# Patient Record
Sex: MALE | Race: OTHER | HISPANIC OR LATINO | Employment: OTHER | URBAN - METROPOLITAN AREA
[De-identification: names, ages, dates, MRNs, and addresses within clinical notes are randomized per-mention and may not be internally consistent; named-entity substitution may affect disease eponyms.]

---

## 2021-03-20 ENCOUNTER — HOSPITAL ENCOUNTER (EMERGENCY)
Facility: HOSPITAL | Age: 21
Discharge: HOME | End: 2021-03-20
Attending: EMERGENCY MEDICINE

## 2021-03-20 VITALS
WEIGHT: 200 LBS | HEIGHT: 66 IN | SYSTOLIC BLOOD PRESSURE: 135 MMHG | BODY MASS INDEX: 32.14 KG/M2 | OXYGEN SATURATION: 98 % | DIASTOLIC BLOOD PRESSURE: 74 MMHG | RESPIRATION RATE: 18 BRPM | TEMPERATURE: 98 F | HEART RATE: 71 BPM

## 2021-03-20 DIAGNOSIS — T78.40XA ALLERGIC REACTION, INITIAL ENCOUNTER: Primary | ICD-10-CM

## 2021-03-20 PROCEDURE — 63600175 PHARM REV CODE 636 W HCPCS: Performed by: PHYSICIAN ASSISTANT

## 2021-03-20 PROCEDURE — 99284 EMERGENCY DEPT VISIT MOD MDM: CPT

## 2021-03-20 PROCEDURE — 25000003 PHARM REV CODE 250: Performed by: PHYSICIAN ASSISTANT

## 2021-03-20 RX ORDER — CETIRIZINE HYDROCHLORIDE 10 MG/1
10 TABLET ORAL DAILY
Qty: 10 TABLET | Refills: 0 | Status: SHIPPED | OUTPATIENT
Start: 2021-03-20 | End: 2021-03-30

## 2021-03-20 RX ORDER — DIPHENHYDRAMINE HCL 25 MG
25-50 CAPSULE ORAL EVERY 6 HOURS PRN
Qty: 20 CAPSULE | Refills: 0 | Status: SHIPPED | OUTPATIENT
Start: 2021-03-20

## 2021-03-20 RX ORDER — DIPHENHYDRAMINE HCL 25 MG
25 CAPSULE ORAL
Status: COMPLETED | OUTPATIENT
Start: 2021-03-20 | End: 2021-03-20

## 2021-03-20 RX ORDER — PREDNISONE 20 MG/1
60 TABLET ORAL
Status: COMPLETED | OUTPATIENT
Start: 2021-03-20 | End: 2021-03-20

## 2021-03-20 RX ORDER — FAMOTIDINE 20 MG/1
20 TABLET, FILM COATED ORAL
Status: COMPLETED | OUTPATIENT
Start: 2021-03-20 | End: 2021-03-20

## 2021-03-20 RX ORDER — PREDNISONE 20 MG/1
TABLET ORAL
Qty: 7 TABLET | Refills: 0 | Status: SHIPPED | OUTPATIENT
Start: 2021-03-20 | End: 2021-03-26

## 2021-03-20 RX ORDER — FAMOTIDINE 20 MG/1
20 TABLET, FILM COATED ORAL 2 TIMES DAILY
Qty: 20 TABLET | Refills: 0 | Status: SHIPPED | OUTPATIENT
Start: 2021-03-20 | End: 2021-03-30

## 2021-03-20 RX ADMIN — PREDNISONE 60 MG: 20 TABLET ORAL at 05:03

## 2021-03-20 RX ADMIN — DIPHENHYDRAMINE HYDROCHLORIDE 25 MG: 25 CAPSULE ORAL at 05:03

## 2021-03-20 RX ADMIN — FAMOTIDINE 20 MG: 20 TABLET ORAL at 05:03

## 2023-10-18 ENCOUNTER — HOSPITAL ENCOUNTER (EMERGENCY)
Facility: HOSPITAL | Age: 23
Discharge: HOME | End: 2023-10-18
Attending: STUDENT IN AN ORGANIZED HEALTH CARE EDUCATION/TRAINING PROGRAM

## 2023-10-18 VITALS
DIASTOLIC BLOOD PRESSURE: 74 MMHG | RESPIRATION RATE: 18 BRPM | HEART RATE: 64 BPM | SYSTOLIC BLOOD PRESSURE: 132 MMHG | BODY MASS INDEX: 34.53 KG/M2 | TEMPERATURE: 98 F | WEIGHT: 220 LBS | HEIGHT: 67 IN | OXYGEN SATURATION: 99 %

## 2023-10-18 DIAGNOSIS — K21.9 GASTRIC REFLUX: Primary | ICD-10-CM

## 2023-10-18 DIAGNOSIS — R10.9 ABDOMINAL PAIN, UNSPECIFIED ABDOMINAL LOCATION: ICD-10-CM

## 2023-10-18 LAB
BASOPHILS # BLD AUTO: 0.04 K/UL (ref 0–0.2)
BASOPHILS NFR BLD: 0.4 % (ref 0–1.9)
BILIRUB UR QL STRIP: NEGATIVE
CLARITY UR: CLEAR
COLOR UR: YELLOW
DIFFERENTIAL METHOD: ABNORMAL
EOSINOPHIL # BLD AUTO: 0.3 K/UL (ref 0–0.5)
EOSINOPHIL NFR BLD: 2.5 % (ref 0–8)
ERYTHROCYTE [DISTWIDTH] IN BLOOD BY AUTOMATED COUNT: 12.8 % (ref 11.5–14.5)
GLUCOSE UR QL STRIP: NEGATIVE
HCT VFR BLD AUTO: 41.8 % (ref 40–54)
HGB BLD-MCNC: 14.1 G/DL (ref 14–18)
HGB UR QL STRIP: ABNORMAL
IMM GRANULOCYTES # BLD AUTO: 0.02 K/UL (ref 0–0.04)
IMM GRANULOCYTES NFR BLD AUTO: 0.2 % (ref 0–0.5)
KETONES UR QL STRIP: NEGATIVE
LEUKOCYTE ESTERASE UR QL STRIP: NEGATIVE
LYMPHOCYTES # BLD AUTO: 3.2 K/UL (ref 1–4.8)
LYMPHOCYTES NFR BLD: 29 % (ref 18–48)
MCH RBC QN AUTO: 27.3 PG (ref 27–31)
MCHC RBC AUTO-ENTMCNC: 33.7 G/DL (ref 32–36)
MCV RBC AUTO: 81 FL (ref 82–98)
MONOCYTES # BLD AUTO: 0.7 K/UL (ref 0.3–1)
MONOCYTES NFR BLD: 6 % (ref 4–15)
NEUTROPHILS # BLD AUTO: 6.8 K/UL (ref 1.8–7.7)
NEUTROPHILS NFR BLD: 61.9 % (ref 38–73)
NITRITE UR QL STRIP: NEGATIVE
NRBC BLD-RTO: 0 /100 WBC
PH UR STRIP: 5 [PH] (ref 5–8)
PLATELET # BLD AUTO: 231 K/UL (ref 150–450)
PMV BLD AUTO: 11.3 FL (ref 9.2–12.9)
PROT UR QL STRIP: ABNORMAL
RBC # BLD AUTO: 5.17 M/UL (ref 4.6–6.2)
SP GR UR STRIP: >1.03 (ref 1–1.03)
URN SPEC COLLECT METH UR: ABNORMAL
UROBILINOGEN UR STRIP-ACNC: NEGATIVE EU/DL
WBC # BLD AUTO: 11.04 K/UL (ref 3.9–12.7)

## 2023-10-18 PROCEDURE — 25000003 PHARM REV CODE 250: Performed by: NURSE PRACTITIONER

## 2023-10-18 PROCEDURE — 63600175 PHARM REV CODE 636 W HCPCS

## 2023-10-18 PROCEDURE — 85025 COMPLETE CBC W/AUTO DIFF WBC: CPT | Performed by: NURSE PRACTITIONER

## 2023-10-18 PROCEDURE — 99284 EMERGENCY DEPT VISIT MOD MDM: CPT

## 2023-10-18 PROCEDURE — 81003 URINALYSIS AUTO W/O SCOPE: CPT | Performed by: NURSE PRACTITIONER

## 2023-10-18 PROCEDURE — 96372 THER/PROPH/DIAG INJ SC/IM: CPT

## 2023-10-18 RX ORDER — MAG HYDROX/ALUMINUM HYD/SIMETH 200-200-20
30 SUSPENSION, ORAL (FINAL DOSE FORM) ORAL
Status: COMPLETED | OUTPATIENT
Start: 2023-10-18 | End: 2023-10-18

## 2023-10-18 RX ORDER — IBUPROFEN 600 MG/1
600 TABLET ORAL EVERY 6 HOURS PRN
Qty: 20 TABLET | Refills: 0 | Status: SHIPPED | OUTPATIENT
Start: 2023-10-18

## 2023-10-18 RX ORDER — KETOROLAC TROMETHAMINE 30 MG/ML
30 INJECTION, SOLUTION INTRAMUSCULAR; INTRAVENOUS
Status: COMPLETED | OUTPATIENT
Start: 2023-10-18 | End: 2023-10-18

## 2023-10-18 RX ORDER — ACETAMINOPHEN 500 MG
500 TABLET ORAL EVERY 6 HOURS PRN
Qty: 30 TABLET | Refills: 0 | Status: SHIPPED | OUTPATIENT
Start: 2023-10-18

## 2023-10-18 RX ORDER — ALUMINUM HYDROXIDE, MAGNESIUM HYDROXIDE, AND SIMETHICONE 2400; 240; 2400 MG/30ML; MG/30ML; MG/30ML
5 SUSPENSION ORAL EVERY 6 HOURS PRN
Qty: 335 ML | Refills: 0 | Status: SHIPPED | OUTPATIENT
Start: 2023-10-18 | End: 2024-10-17

## 2023-10-18 RX ORDER — ONDANSETRON 4 MG/1
4 TABLET, ORALLY DISINTEGRATING ORAL EVERY 6 HOURS PRN
Qty: 15 TABLET | Refills: 0 | Status: SHIPPED | OUTPATIENT
Start: 2023-10-18

## 2023-10-18 RX ORDER — FAMOTIDINE 20 MG/1
20 TABLET, FILM COATED ORAL 2 TIMES DAILY
Qty: 20 TABLET | Refills: 0 | Status: SHIPPED | OUTPATIENT
Start: 2023-10-18 | End: 2024-10-17

## 2023-10-18 RX ADMIN — KETOROLAC TROMETHAMINE 30 MG: 30 INJECTION, SOLUTION INTRAMUSCULAR; INTRAVENOUS at 07:10

## 2023-10-18 RX ADMIN — ALUMINUM HYDROXIDE, MAGNESIUM HYDROXIDE, AND DIMETHICONE 30 ML: 200; 20; 200 SUSPENSION ORAL at 06:10

## 2023-10-19 NOTE — DISCHARGE INSTRUCTIONS

## 2023-10-19 NOTE — ED PROVIDER NOTES
"Encounter Date: 10/18/2023       History     Chief Complaint   Patient presents with    Abdominal Pain     Reports epigastric "very severe and sharp pain." Started 3 days ago, states he started a new diet 3 months ago and thinks it may be related to that. Denies n/v/ diarrhea. No meds PTA     The history is provided by the patient, medical records and a significant other. A  was used.     23-year-old male with no known pertinent past medical history presenting to the emergency department today complaining of epigastric pain x3 days.  Patient states he started a new low-fat diet 3 months ago and thinks this may be the cause of his symptoms.  Patient has not taken any medications for his symptoms.  Patient states lying flat makes his symptoms worse.  Patient states the pain is 6/10 localized substernal and epigastric and burning sensation.  Patient denies any fever, chest pain, shortness of breath, N/V/D, flank pain, dysuria, melena/hematochezia    Review of patient's allergies indicates:  No Known Allergies  History reviewed. No pertinent past medical history.  History reviewed. No pertinent surgical history.  History reviewed. No pertinent family history.  Social History     Tobacco Use    Smoking status: Never    Smokeless tobacco: Never   Substance Use Topics    Alcohol use: Never    Drug use: Never     Review of Systems   Constitutional:  Negative for chills, diaphoresis and fever.   HENT:  Negative for congestion, rhinorrhea and sore throat.    Eyes:  Negative for pain, redness and visual disturbance.   Respiratory:  Negative for cough and shortness of breath.    Cardiovascular:  Negative for chest pain.   Gastrointestinal:  Positive for abdominal pain (Epigastric). Negative for abdominal distention, blood in stool, constipation, diarrhea, nausea and vomiting.   Genitourinary:  Negative for difficulty urinating, dysuria, flank pain and hematuria.   Musculoskeletal:  Negative for back pain, " neck pain and neck stiffness.   Skin:  Negative for rash and wound.   Neurological:  Negative for dizziness, weakness, light-headedness and headaches.   Hematological:  Does not bruise/bleed easily.   Psychiatric/Behavioral:  Negative for decreased concentration.        Physical Exam     Initial Vitals [10/18/23 1822]   BP Pulse Resp Temp SpO2   (!) 140/84 60 18 98.2 °F (36.8 °C) 99 %      MAP       --         Physical Exam    Nursing note and vitals reviewed.  Constitutional: He appears well-developed and well-nourished. He is not diaphoretic. He does not have a sickly appearance. He does not appear ill. No distress.   HENT:   Head: Normocephalic and atraumatic. Head is without raccoon's eyes and without Ta's sign.   Right Ear: External ear normal.   Left Ear: External ear normal.   Nose: Nose normal.   Mouth/Throat: Uvula is midline, oropharynx is clear and moist and mucous membranes are normal.   Eyes: Conjunctivae and EOM are normal. Pupils are equal, round, and reactive to light. Right eye exhibits no discharge. Left eye exhibits no discharge. No scleral icterus.   Neck: Trachea normal.   Normal range of motion.   Full passive range of motion without pain.     Cardiovascular:  Normal rate, regular rhythm, normal heart sounds, intact distal pulses and normal pulses.           Pulmonary/Chest: Effort normal and breath sounds normal. No respiratory distress. He exhibits no tenderness.   Abdominal: Abdomen is soft. Bowel sounds are normal. He exhibits no distension and no pulsatile midline mass. There is no abdominal tenderness.   No right CVA tenderness.  No left CVA tenderness. There is no rebound and no guarding.   Musculoskeletal:         General: Normal range of motion.      Right shoulder: Normal.      Left shoulder: Normal.      Right elbow: Normal.      Left elbow: Normal.      Right wrist: Normal.      Left wrist: Normal.      Right hand: Normal.      Left hand: Normal.      Cervical back: Normal, full  passive range of motion without pain and normal range of motion.      Thoracic back: Normal.      Lumbar back: Normal.      Right hip: Normal.      Left hip: Normal.      Right knee: Normal.      Left knee: Normal.      Right lower leg: Normal.      Left lower leg: Normal.      Right ankle: Normal.      Left ankle: Normal.      Right foot: Normal.      Left foot: Normal.     Lymphadenopathy:     He has no cervical adenopathy.   Neurological: He is alert and oriented to person, place, and time. He has normal strength. No cranial nerve deficit or sensory deficit. Coordination and gait normal.   Skin: Skin is warm and dry. Capillary refill takes less than 2 seconds. No bruising, no ecchymosis and no rash noted. No erythema.   Psychiatric: He has a normal mood and affect. His speech is normal and behavior is normal. Thought content normal.         ED Course   Procedures  Labs Reviewed   CBC W/ AUTO DIFFERENTIAL - Abnormal; Notable for the following components:       Result Value    MCV 81 (*)     All other components within normal limits   URINALYSIS, REFLEX TO URINE CULTURE - Abnormal; Notable for the following components:    Specific Gravity, UA >1.030 (*)     Protein, UA Trace (*)     Occult Blood UA Trace (*)     All other components within normal limits    Narrative:     Specimen Source->Urine          Imaging Results    None          Medications   aluminum-magnesium hydroxide-simethicone 200-200-20 mg/5 mL suspension 30 mL (30 mLs Oral Given 10/18/23 1829)   ketorolac injection 30 mg (30 mg Intramuscular Given 10/18/23 1909)     Medical Decision Making  23-year-old male with no known pertinent past medical history presenting to the emergency department today complaining of epigastric pain x3 days.  Patient states he started a new low-fat diet 3 months ago and thinks this may be the cause of his symptoms.  Patient has not taken any medications for his symptoms.  Patient states lying flat makes his symptoms worse.   Patient states the pain is 6/10 localized substernal and epigastric and burning sensation.  Patient denies any fever, chest pain, shortness of breath, N/V/D, flank pain, dysuria, melena/hematochezia  Patient's chart and medical history reviewed.  Patient's vitals reviewed.  They are afebrile, no respiratory distress, nontoxic-appearing in the ED.  - Gastroenteritis: No N/V/D  - ulcer/perforation:  No history of alcohol abuse, no history of H pylori, no history of chronic NSAID use.  - cholecystitis:  No right upper quadrant pain, negative Saab's sign.  - cholangitis:  No fever, no right upper quadrant pain, no jaundice, unlikely  - pancreatitis:  No history of gallstones, no history of alcohol abuse, no epigastric tenderness. No epigastric pain after Maalox and Toradol administration.  - small bowel obstruction:  No history of abdominal surgeries  - appendicitis:  No right lower quadrant pain, negative McBurney's point tenderness, negative Rovsing  - AAA/Dissection:  2+ pulses upper and lower extremities bilaterally, stable vital signs, no abdominal pulsating masses.  - diverticulitis:  No left lower quadrant pain/tenderness to palpation. No leukocytosis.   - GERD:  Considered likely with burning sensation to the epigastric/midsternal region worse with lying down and after eating.  Plan discussed with the patient to give Maalox and reassess for abdominal pain.  Administration patient has resolution of symptoms.  Lab results discussed with patient and plan is made using shared decision-making with the patient to have him follow up with his primary care physician in the next 3-5 days and be sent home with medication for gastric reflux.  Patient agrees with this plan without any questions for me at this time.  Patient is currently hemodynamically stable, afebrile, tolerating p.o. intake, ambulatory without assistance, having complete resolution of his symptoms.   Danny Burris  presents to the emergency Department  today with upper abdominal pain.  Their workup today does not show any signs of acute abnormality which hospitalization would be appropriate or necessary.  No sign of cholecystitis, pancreatitis as above.  Unlikely to be an ileus or small bowel obstruction given the patient's presentation and physical exam.  The pt has remained afebrile here in the emergency department.  They are tolerating by mouth.      At this time I'll discharge home to follow up with primary care physician for further evaluation.  If the pain continues the pt will need to see GI for further evaluation.  The patient is comfortable with this plan and comfortable going home at this time. After taking into careful account the historical factors and physical exam findings of the patient's presentation today, in conjunction with the empirical and objective data obtained on ED workup, no acute emergent medical condition has been identified. The patient appears to be low risk for an emergent medical condition and I feel it is safe and appropriate at this time for the patient to be discharged to follow-up as detailed in their discharge instructions for reevaluation and possible continued outpatient workup and management. I have discussed the specifics of the workup with the patient and the patient has verbalized understanding of the details of the workup, the diagnosis, the treatment plan, and the need for outpatient follow-up.  Although the patient has no emergent etiology today this does not preclude the development of an emergent condition so in addition, I have advised the patient that they can return to the ED and/or activate EMS at any time with worsening of their symptoms, change of their symptoms, or with any other medical complaint.  The patient remained comfortable and stable during their visit in the ED.  Discharge and follow-up instructions discussed with the patient who expressed understanding and willingness to comply with my  recommendations.           Amount and/or Complexity of Data Reviewed  External Data Reviewed: notes.  Labs: ordered.    Risk  OTC drugs.  Prescription drug management.  Diagnosis or treatment significantly limited by social determinants of health.                               Clinical Impression:   Final diagnoses:  [K21.9] Gastric reflux (Primary)  [R10.9] Abdominal pain, unspecified abdominal location        ED Disposition Condition    Discharge Stable          ED Prescriptions       Medication Sig Dispense Start Date End Date Auth. Provider    ondansetron (ZOFRAN-ODT) 4 MG TbDL Take 1 tablet (4 mg total) by mouth every 6 (six) hours as needed (nausea). 15 tablet 10/18/2023 -- Hoang Johnson PA-C    aluminum & magnesium hydroxide-simethicone (MAALOX MAXIMUM STRENGTH) 400-400-40 mg/5 mL suspension Take 5 mLs by mouth every 6 (six) hours as needed for Indigestion. 335 mL 10/18/2023 10/17/2024 Hoang Johnson PA-C    famotidine (PEPCID) 20 MG tablet Take 1 tablet (20 mg total) by mouth 2 (two) times daily. 20 tablet 10/18/2023 10/17/2024 Hoang Johnson PA-C    ibuprofen (ADVIL,MOTRIN) 600 MG tablet Take 1 tablet (600 mg total) by mouth every 6 (six) hours as needed for Pain. 20 tablet 10/18/2023 -- Hoang Johnson PA-C    acetaminophen (TYLENOL) 500 MG tablet Take 1 tablet (500 mg total) by mouth every 6 (six) hours as needed for Pain. 30 tablet 10/18/2023 -- Hoang Johnson PA-C          Follow-up Information       Follow up With Specialties Details Why Contact Info    [facility] -  Schedule an appointment as soon as possible for a visit in 1 week As needed [address]  [place] [id number] [phone]      [facility]  Schedule an appointment as soon as possible for a visit in 1 week As needed [phone]             Hoang Johnson PA-C  10/18/23 8751

## 2023-10-20 ENCOUNTER — HOSPITAL ENCOUNTER (EMERGENCY)
Facility: HOSPITAL | Age: 23
Discharge: HOME | End: 2023-10-20
Attending: STUDENT IN AN ORGANIZED HEALTH CARE EDUCATION/TRAINING PROGRAM

## 2023-10-20 VITALS
TEMPERATURE: 98 F | RESPIRATION RATE: 16 BRPM | DIASTOLIC BLOOD PRESSURE: 88 MMHG | WEIGHT: 220 LBS | BODY MASS INDEX: 34.46 KG/M2 | OXYGEN SATURATION: 99 % | SYSTOLIC BLOOD PRESSURE: 132 MMHG | HEART RATE: 60 BPM

## 2023-10-20 DIAGNOSIS — K85.90 ACUTE PANCREATITIS, UNSPECIFIED COMPLICATION STATUS, UNSPECIFIED PANCREATITIS TYPE: Primary | ICD-10-CM

## 2023-10-20 DIAGNOSIS — R74.8 ELEVATED LIVER ENZYMES: ICD-10-CM

## 2023-10-20 DIAGNOSIS — R10.13 EPIGASTRIC ABDOMINAL PAIN: ICD-10-CM

## 2023-10-20 LAB
ALBUMIN SERPL BCP-MCNC: 3.9 G/DL (ref 3.5–5.2)
ALP SERPL-CCNC: 121 U/L (ref 55–135)
ALT SERPL W/O P-5'-P-CCNC: 152 U/L (ref 10–44)
ANION GAP SERPL CALC-SCNC: 11 MMOL/L (ref 8–16)
AST SERPL-CCNC: 72 U/L (ref 10–40)
BASOPHILS # BLD AUTO: 0.04 K/UL (ref 0–0.2)
BASOPHILS NFR BLD: 0.5 % (ref 0–1.9)
BILIRUB SERPL-MCNC: 0.3 MG/DL (ref 0.1–1)
BUN SERPL-MCNC: 12 MG/DL (ref 6–20)
CALCIUM SERPL-MCNC: 9.3 MG/DL (ref 8.7–10.5)
CHLORIDE SERPL-SCNC: 109 MMOL/L (ref 95–110)
CHOLEST SERPL-MCNC: 166 MG/DL (ref 120–199)
CHOLEST/HDLC SERPL: 5.2 {RATIO} (ref 2–5)
CO2 SERPL-SCNC: 22 MMOL/L (ref 23–29)
CREAT SERPL-MCNC: 1.2 MG/DL (ref 0.5–1.4)
DIFFERENTIAL METHOD: ABNORMAL
EOSINOPHIL # BLD AUTO: 0.3 K/UL (ref 0–0.5)
EOSINOPHIL NFR BLD: 2.9 % (ref 0–8)
ERYTHROCYTE [DISTWIDTH] IN BLOOD BY AUTOMATED COUNT: 13.2 % (ref 11.5–14.5)
EST. GFR  (NO RACE VARIABLE): >60 ML/MIN/1.73 M^2
GLUCOSE SERPL-MCNC: 100 MG/DL (ref 70–110)
HCT VFR BLD AUTO: 39.8 % (ref 40–54)
HDLC SERPL-MCNC: 32 MG/DL (ref 40–75)
HDLC SERPL: 19.3 % (ref 20–50)
HGB BLD-MCNC: 13.5 G/DL (ref 14–18)
IMM GRANULOCYTES # BLD AUTO: 0.02 K/UL (ref 0–0.04)
IMM GRANULOCYTES NFR BLD AUTO: 0.2 % (ref 0–0.5)
LDLC SERPL CALC-MCNC: 98.2 MG/DL (ref 63–159)
LIPASE SERPL-CCNC: 288 U/L (ref 4–60)
LYMPHOCYTES # BLD AUTO: 2.9 K/UL (ref 1–4.8)
LYMPHOCYTES NFR BLD: 33.1 % (ref 18–48)
MCH RBC QN AUTO: 27.9 PG (ref 27–31)
MCHC RBC AUTO-ENTMCNC: 33.9 G/DL (ref 32–36)
MCV RBC AUTO: 82 FL (ref 82–98)
MONOCYTES # BLD AUTO: 0.6 K/UL (ref 0.3–1)
MONOCYTES NFR BLD: 6.9 % (ref 4–15)
NEUTROPHILS # BLD AUTO: 5 K/UL (ref 1.8–7.7)
NEUTROPHILS NFR BLD: 56.4 % (ref 38–73)
NONHDLC SERPL-MCNC: 134 MG/DL
NRBC BLD-RTO: 0 /100 WBC
PLATELET # BLD AUTO: 210 K/UL (ref 150–450)
PMV BLD AUTO: 11.5 FL (ref 9.2–12.9)
POTASSIUM SERPL-SCNC: 3.6 MMOL/L (ref 3.5–5.1)
PROT SERPL-MCNC: 7.3 G/DL (ref 6–8.4)
RBC # BLD AUTO: 4.84 M/UL (ref 4.6–6.2)
SODIUM SERPL-SCNC: 142 MMOL/L (ref 136–145)
TRIGL SERPL-MCNC: 179 MG/DL (ref 30–150)
WBC # BLD AUTO: 8.84 K/UL (ref 3.9–12.7)

## 2023-10-20 PROCEDURE — 25500020 PHARM REV CODE 255: Performed by: STUDENT IN AN ORGANIZED HEALTH CARE EDUCATION/TRAINING PROGRAM

## 2023-10-20 PROCEDURE — 85025 COMPLETE CBC W/AUTO DIFF WBC: CPT | Performed by: STUDENT IN AN ORGANIZED HEALTH CARE EDUCATION/TRAINING PROGRAM

## 2023-10-20 PROCEDURE — 80061 LIPID PANEL: CPT | Performed by: STUDENT IN AN ORGANIZED HEALTH CARE EDUCATION/TRAINING PROGRAM

## 2023-10-20 PROCEDURE — 80053 COMPREHEN METABOLIC PANEL: CPT | Performed by: STUDENT IN AN ORGANIZED HEALTH CARE EDUCATION/TRAINING PROGRAM

## 2023-10-20 PROCEDURE — 25000003 PHARM REV CODE 250: Performed by: STUDENT IN AN ORGANIZED HEALTH CARE EDUCATION/TRAINING PROGRAM

## 2023-10-20 PROCEDURE — 99285 EMERGENCY DEPT VISIT HI MDM: CPT | Mod: 25

## 2023-10-20 PROCEDURE — 83690 ASSAY OF LIPASE: CPT | Performed by: STUDENT IN AN ORGANIZED HEALTH CARE EDUCATION/TRAINING PROGRAM

## 2023-10-20 RX ORDER — MAG HYDROX/ALUMINUM HYD/SIMETH 200-200-20
30 SUSPENSION, ORAL (FINAL DOSE FORM) ORAL ONCE
Status: COMPLETED | OUTPATIENT
Start: 2023-10-20 | End: 2023-10-20

## 2023-10-20 RX ORDER — FAMOTIDINE 20 MG/1
20 TABLET, FILM COATED ORAL
Status: COMPLETED | OUTPATIENT
Start: 2023-10-20 | End: 2023-10-20

## 2023-10-20 RX ORDER — PANTOPRAZOLE SODIUM 20 MG/1
20 TABLET, DELAYED RELEASE ORAL DAILY
Qty: 30 TABLET | Refills: 0 | Status: SHIPPED | OUTPATIENT
Start: 2023-10-20 | End: 2024-10-19

## 2023-10-20 RX ORDER — SUCRALFATE 1 G/10ML
1 SUSPENSION ORAL 2 TIMES DAILY PRN
Qty: 200 ML | Refills: 0 | Status: SHIPPED | OUTPATIENT
Start: 2023-10-20

## 2023-10-20 RX ADMIN — ALUMINUM HYDROXIDE, MAGNESIUM HYDROXIDE, AND DIMETHICONE 30 ML: 200; 20; 200 SUSPENSION ORAL at 04:10

## 2023-10-20 RX ADMIN — IOHEXOL 85 ML: 350 INJECTION, SOLUTION INTRAVENOUS at 05:10

## 2023-10-20 RX ADMIN — FAMOTIDINE 20 MG: 20 TABLET, FILM COATED ORAL at 04:10

## 2023-10-20 NOTE — DISCHARGE INSTRUCTIONS
Dieta de líquidos love pancho las siguientes 48 horas, luego progrese con la dieta según la tolerancia. Comience a tello Protonix max vez al día. Carafate 2-3 veces al día según sea necesario para el dolor abdominal superior intenso o que empeora después de las comidas. Dieta estricta para la ERGE. Dieta baja en grasas. Por favor roberta seguimiento con Gastroenterología para evaluación de pancreatitis y cálculos biliares. Roberta un seguimiento con cirugía general para max reevaluación de los cálculos biliares, posible necesidad de extirpación de la vesícula biliar. Regrese a misa servicio de urgencias si experimenta un empeoramiento del dolor, si no puede comer ni beber, si tiene fiebre, si comienza con náuseas y vómitos, si los síntomas empeoran a pesar del tratamiento o si ocurre algún otro problema.    Clear liquid diet for the next 48 hours, after that progress diet as tolerated.  Begin taking Protonix once daily.  Carafate 2-3 times daily as needed for severe or worsening upper abdominal pain following meals.  Strict GERD diet.  Low-fat diet.  Please follow-up with Gastroenterology for evaluation of pancreatitis and gallstones.  Please follow-up with general surgery for re-evaluation of gallstones, possible need for gallbladder removal.  Return to this ED if you experience worsening pain, if unable to eat or drink, if you develop fever, if you begin with nausea vomiting, if symptoms worsen despite treatment, if any other problems occur.

## 2023-10-20 NOTE — ED NOTES
Pt has had epigastric burning X 2 hours. He ws seen here and diagnosed with gastritis. He took the medications prescribed except for the liquid maalox. Denies N/V just has epigastric pain    LOC: Pt is awake alert and aware of environment, oriented X3 and speaking appropriately  Appearance: Pt is in no acute distress, Pt is well groomed and clean  Skin: skin is warm and dry with normal turgor, mucus membranes are moist and pink, skin is intact with no bruising or breakdown  Muskuloskeletal: Pt moves all extremities well, there is no obvious swelling or deformities noted, pulses are intact.  Respiratory: Airway is open and patent, respirations are spontaneous and even.  Cardiac:  no edema and cap refill is <3sec  Abdomen: soft, non-tender and non-distended  Neuro: Pt follows commands easily and has no obvious deficits

## 2023-10-20 NOTE — ED PROVIDER NOTES
Encounter Date: 10/20/2023       History     Chief Complaint   Patient presents with    Abdominal Pain     Epigastric pain X 2 hours but seen here Thursday for same symptoms and told he had gastritis     24yo M presents to ED with persistent epigastric pain.    Pt began with epigastric pain last week, typically follows meals, usually worse in the evening. No hx reflux, no hx gallbladder issues. Seen in this ED on 10/18, taking pepcid or tylenol without relief; unable to fill the maalox rx. No associated n/v. No cough. No CP. No SOB. No fever. Normal BMs. No urinary complaints. Pain intermittent since last week. No radiation of pain. Pain worse with lying supine, with deep breaths.     Denies significant pmh    No hx abdominal surgeries    The history is provided by the patient and a relative. The history is limited by a language barrier. A  was used (family member translating and interpreting).     Review of patient's allergies indicates:  No Known Allergies  History reviewed. No pertinent past medical history.  History reviewed. No pertinent surgical history.  History reviewed. No pertinent family history.  Social History     Tobacco Use    Smoking status: Never    Smokeless tobacco: Never   Substance Use Topics    Alcohol use: Never    Drug use: Never     Review of Systems   Constitutional:  Negative for fever.   Respiratory:  Negative for cough and shortness of breath.    Cardiovascular:  Positive for chest pain.   Gastrointestinal:  Positive for abdominal pain. Negative for constipation, diarrhea, nausea and vomiting.   Genitourinary:  Negative for decreased urine volume, dysuria and flank pain.   Musculoskeletal:  Negative for back pain, myalgias and neck pain.   Skin:  Negative for rash.   Neurological:  Negative for syncope.       Physical Exam     Initial Vitals [10/20/23 0323]   BP Pulse Resp Temp SpO2   (!) 155/92 60 16 98.4 °F (36.9 °C) 100 %      MAP       --         Physical  Exam    Nursing note and vitals reviewed.  Constitutional: He appears well-developed and well-nourished. He is not diaphoretic. No distress.   Well-appearing, nontoxic.    HENT:   Head: Normocephalic and atraumatic.   Neck: Neck supple.   Normal range of motion.  Pulmonary/Chest: No respiratory distress.   Abdominal: Abdomen is soft. Bowel sounds are normal. He exhibits no distension.   Epigastric ttp. Negative shaffer's sign.    Musculoskeletal:      Cervical back: Normal range of motion and neck supple.     Neurological: He is alert and oriented to person, place, and time. GCS score is 15. GCS eye subscore is 4. GCS verbal subscore is 5. GCS motor subscore is 6.   Skin: Skin is warm. Capillary refill takes less than 2 seconds.   Psychiatric: He has a normal mood and affect. Thought content normal.         ED Course   Procedures  Labs Reviewed   COMPREHENSIVE METABOLIC PANEL - Abnormal; Notable for the following components:       Result Value    CO2 22 (*)     AST 72 (*)      (*)     All other components within normal limits   CBC W/ AUTO DIFFERENTIAL - Abnormal; Notable for the following components:    Hemoglobin 13.5 (*)     Hematocrit 39.8 (*)     All other components within normal limits   LIPASE - Abnormal; Notable for the following components:    Lipase 288 (*)     All other components within normal limits   LIPID PANEL - Abnormal; Notable for the following components:    Triglycerides 179 (*)     HDL 32 (*)     HDL/Cholesterol Ratio 19.3 (*)     Total Cholesterol/HDL Ratio 5.2 (*)     All other components within normal limits   LIPID PANEL          Imaging Results              CT Abdomen Pelvis With Contrast (Final result)  Result time 10/20/23 06:15:09      Final result by Kamar Cunningham MD (10/20/23 06:15:09)                   Impression:      Cholelithiasis with possible mild gallbladder wall thickening versus pericholecystic fluid.    No overt peripancreatic inflammatory changes in this  patient with elevated serum lipase level.    Borderline splenomegaly.      Electronically signed by: Kamar Cunningham MD  Date:    10/20/2023  Time:    06:15               Narrative:    EXAMINATION:  CT ABDOMEN PELVIS WITH CONTRAST    CLINICAL HISTORY:  Pancreatitis, acute, severe;    TECHNIQUE:  Low dose axial images, sagittal and coronal reformations were obtained from the lung bases to the pubic symphysis following the IV administration of 85 mL of Omnipaque 350 .  Oral contrast was not given.    COMPARISON:  Abdominal ultrasound, 10/20/2023.    FINDINGS:  Lower Chest:    Lung bases are essentially clear.  No pleural or pericardial effusion.  Patulous appearance of the lower esophagus.    Abdomen:    Exam quality is limited by motion.    Liver is normal in size and contour.  Probable focal fatty infiltration along the falciform ligament.  No worrisome liver mass.  Multiple gallstones noted in the gallbladder.  There is probable gallbladder wall thickening versus trace pericholecystic fluid.  No intrahepatic biliary ductal dilatation.    Spleen is at the upper limits of normal in size.  Adrenal glands are unremarkable.  No significant peripancreatic fat stranding allowing for motion limitations.  No peripancreatic fluid collection or vascular complication identified.    The kidneys are symmetric.  No hydronephrosis.    No small bowel obstruction.  Appendix is unremarkable.    No pneumoperitoneum or organized fluid collection.    No bulky retroperitoneal lymphadenopathy.    Abdominal aorta is normal in caliber without significant atherosclerosis.    Portal vein is grossly patent though suboptimally opacified, likely related to early bolus timing.    Pelvis:    Urinary bladder and rectum are unremarkable.  No free fluid in the pelvis.  No pelvic lymphadenopathy.    Bones and soft tissues:    No aggressive osseous lesions.  Extraperitoneal soft tissues are negative for acute finding.                                   "     US Abdomen Limited (Final result)  Result time 10/20/23 05:03:40      Final result by Enmanuel Mueller MD (10/20/23 05:03:40)                   Impression:      Numerous apparent calculi within the gallbladder lumen.  No definite sonographic evidence of acute cholecystitis at this time.  If there is strong clinical concern for acute cholecystitis, HIDA scan could be performed for more sensitive assessment.      Electronically signed by: Enmanuel Mueller MD  Date:    10/20/2023  Time:    05:03               Narrative:    EXAMINATION:  US ABDOMEN LIMITED    CLINICAL HISTORY:  epigastric pain, post prandial pain;    TECHNIQUE:  Limited ultrasound of the right upper quadrant of the abdomen (including pancreas, liver, gallbladder, common bile duct, and spleen) was performed.    COMPARISON:  None.    FINDINGS:  Liver: The visualized hepatic parenchyma demonstrates no focal abnormality.    Gallbladder: The gallbladder appears to be filled with numerous calculi with associated "wall echo sign".  No significant pericholecystic fluid appreciated.  The technologist reports a negative sonographic Saab sign.    Biliary system: The common duct is not dilated, measuring 3 mm.  No intrahepatic ductal dilatation.    Pancreas: Obscured by bowel gas.    Right kidney: No evidence of hydronephrosis.    Miscellaneous: No upper abdominal ascites.                                       X-Ray Chest PA And Lateral (Final result)  Result time 10/20/23 04:10:21      Final result by Enmanuel Mueller MD (10/20/23 04:10:21)                   Impression:      No radiographic evidence of acute intrathoracic process.      Electronically signed by: Enmanuel Mueller MD  Date:    10/20/2023  Time:    04:10               Narrative:    EXAMINATION:  XR CHEST PA AND LATERAL    CLINICAL HISTORY:  Epigastric pain    TECHNIQUE:  PA and lateral views of the chest were performed.    COMPARISON:  None    FINDINGS:  The cardiomediastinal silhouette " appears within normal limits.  The lungs are symmetrically aerated without evidence of focal airspace consolidation.  There is no pleural effusion or pneumothorax.  Visualized osseous structures appear intact.                                    X-Rays:   Independently Interpreted Readings:   Chest X-Ray: Personal interpretation:  Mild cardiomegaly, no dense consolidation, no convincing pneumothorax, no obvious pleural effusion.     Medications   famotidine tablet 20 mg (20 mg Oral Given 10/20/23 1834)   aluminum-magnesium hydroxide-simethicone 200-200-20 mg/5 mL suspension 30 mL (30 mLs Oral Given 10/20/23 0400)   iohexoL (OMNIPAQUE 350) injection 85 mL (85 mLs Intravenous Given 10/20/23 7893)     Medical Decision Making  D/D: GERD, gastritis, cholecystitis, enteritis, colitis, pancreatitis, SBO, constipation    Amount and/or Complexity of Data Reviewed  External Data Reviewed: labs and notes.  Labs: ordered. Decision-making details documented in ED Course.     Details: Lipase elevated.  Despite negative Saab sign, given postprandial pain, elevated lipase, will order gallbladder ultrasound see if there is an obstructive etiology.  Denies associated nausea vomiting.  No rebound or guarding.  No peritoneal signs.  Low suspicion for surgical abdomen. No hx HLD. No recent viral illness. Denies ETOH use. ALT>AST, makes ETOH less likely. Normal total bilirubin. No hx hepatitis. No recent URI symptoms, no recent n/v/d symptoms to suggest viral hepatitis.   Radiology: ordered and independent interpretation performed. Decision-making details documented in ED Course.  Discussion of management or test interpretation with external provider(s): Abdominal pain improved on re-evaluation.  No longer with epigastric tenderness.  CT abdomen pelvis pending given abnormal liver enzymes, cholelithiasis without convincing evidence of cholecystitis, pancreatitis.  No associated nausea vomiting.  Pain is prandial and/or postprandial.   Young and otherwise healthy with no significant comorbidities.  No history of any abdominal surgeries.  Reassuring vitals.  Will await CT abdomen prior to final disposition    Risk  OTC drugs.  Prescription drug management.               ED Course as of 10/20/23 0648   Fri Oct 20, 2023   0533 Slightly elevated triglycerides, do not think would be enough to account for pancreatitis.  Gallbladder ultrasound with numerous gallstones, no convincing secondary findings of acute cholecystitis on ultrasound. [SM]   0639 Discussed likelihood of pancreatitis related to cholelithiasis with patient and family member, advised admit for GI evaluation and further workup.  Patient and family member are unable to be admitted at this time due to some extenuating circumstances at home.  He denies any pain, nausea, or any other complaints at this time.  After discussion, we have elected to refer to GI and General surgery as an outpatient, however we have given strict return precautions.  Advised clear liquid diet for the next 48 hours, after that GERD diet/low-fat diet.  Will start on daily PPI.  Discussed interim red flags and reasons for return.  They are comfortable plan. [SM]      ED Course User Index  [SM] Ramirez Massey PA-C          This is doctor Schneider dictating.  I will sign this patient out against medical advice.          Clinical Impression:   Final diagnoses:  [R10.13] Epigastric abdominal pain  [K85.90] Acute pancreatitis, unspecified complication status, unspecified pancreatitis type (Primary)  [R74.8] Elevated liver enzymes        ED Disposition Condition    Discharge Stable          ED Prescriptions       Medication Sig Dispense Start Date End Date Auth. Provider    sucralfate (CARAFATE) 100 mg/mL suspension Take 10 mLs (1 g total) by mouth 2 (two) times daily as needed (Persistent abdominal pain). 200 mL 10/20/2023 -- Ramirez Massey PA-C    pantoprazole (PROTONIX) 20 MG tablet Take 1 tablet (20 mg total)  by mouth once daily. 30 tablet 10/20/2023 10/19/2024 Ramirez Massey, JASEN          Follow-up Information       Follow up With Specialties Details Why Contact Info    [facility] Schedule an appointment as soon as possible for a visit  For reevaluation by Gastroenterology [address] [place] [address]  [id number]  [facility] [id number]  [phone]      Surgery, [facility] - Trauma/General Trauma Surgery, General Surgery, Surgery Schedule an appointment as soon as possible for a visit  For reevaluation by General Surgery [address] [place] [id number]  [phone]                      Ramirez Massey PA-C  10/20/23 0648       Justin Schneider MD  10/20/23 0715